# Patient Record
Sex: FEMALE | URBAN - METROPOLITAN AREA
[De-identification: names, ages, dates, MRNs, and addresses within clinical notes are randomized per-mention and may not be internally consistent; named-entity substitution may affect disease eponyms.]

---

## 2020-04-02 ENCOUNTER — APPOINTMENT (RX ONLY)
Dept: URBAN - METROPOLITAN AREA CLINIC 151 | Facility: CLINIC | Age: 25
Setting detail: DERMATOLOGY
End: 2020-04-02

## 2020-04-02 DIAGNOSIS — L64.8 OTHER ANDROGENIC ALOPECIA: ICD-10-CM | Status: INADEQUATELY CONTROLLED

## 2020-04-02 PROCEDURE — ? COUNSELING

## 2020-04-02 PROCEDURE — 99201: CPT | Mod: 95

## 2020-04-02 PROCEDURE — ? PRESCRIPTION MEDICATION MANAGEMENT

## 2020-04-02 PROCEDURE — ? ORDER TESTS

## 2020-04-02 PROCEDURE — ? DIAGNOSIS COMMENT

## 2020-04-02 NOTE — PROCEDURE: PRESCRIPTION MEDICATION MANAGEMENT
Detail Level: Zone
Otc Regimen: Multivitamin \\nRogaine 5% foam QHS
Render In Strict Bullet Format?: No

## 2020-04-02 NOTE — PROCEDURE: ORDER TESTS
Expected Date Of Service: 04/02/2020
Performing Laboratory: [id number]
Bill For Surgical Tray: no
Lab Facility: [id number]
Billing Type: Third-Party Bill

## 2020-04-02 NOTE — PROCEDURE: DIAGNOSIS COMMENT
Comment: Rudy HOPPER, suggested given pt’s younger age that we obtain labs to r/o PCOS and low ferritin. Pt has had normal thryoid lab testing done recently.
Detail Level: Zone

## 2020-10-23 ENCOUNTER — APPOINTMENT (RX ONLY)
Dept: URBAN - METROPOLITAN AREA CLINIC 151 | Facility: CLINIC | Age: 25
Setting detail: DERMATOLOGY
End: 2020-10-23

## 2020-10-23 DIAGNOSIS — Q828 OTHER SPECIFIED ANOMALIES OF SKIN: ICD-10-CM

## 2020-10-23 DIAGNOSIS — L64.8 OTHER ANDROGENIC ALOPECIA: ICD-10-CM

## 2020-10-23 DIAGNOSIS — Q819 OTHER SPECIFIED ANOMALIES OF SKIN: ICD-10-CM

## 2020-10-23 DIAGNOSIS — Q826 OTHER SPECIFIED ANOMALIES OF SKIN: ICD-10-CM

## 2020-10-23 PROBLEM — L85.8 OTHER SPECIFIED EPIDERMAL THICKENING: Status: ACTIVE | Noted: 2020-10-23

## 2020-10-23 PROCEDURE — ? COUNSELING

## 2020-10-23 PROCEDURE — ? PRESCRIPTION MEDICATION MANAGEMENT

## 2020-10-23 PROCEDURE — ? DIAGNOSIS COMMENT

## 2020-10-23 PROCEDURE — 99213 OFFICE O/P EST LOW 20 MIN: CPT

## 2020-10-23 PROCEDURE — ? PRESCRIPTION SAMPLES PROVIDED

## 2020-10-23 PROCEDURE — ? ORDER TESTS

## 2020-10-23 PROCEDURE — ? PRESCRIPTION

## 2020-10-23 RX ORDER — SPIRONOLACTONE 50 MG/1
TABLET, FILM COATED ORAL
Qty: 90 | Refills: 3 | Status: ERX | COMMUNITY
Start: 2020-10-23

## 2020-10-23 RX ADMIN — SPIRONOLACTONE: 50 TABLET, FILM COATED ORAL at 00:00

## 2020-10-23 NOTE — PROCEDURE: ORDER TESTS
Expected Date Of Service: 10/23/2020
Billing Type: Third-Party Bill
Lab Facility: [id number]
Bill For Surgical Tray: no
Performing Laboratory: [id number]

## 2020-10-23 NOTE — PROCEDURE: PRESCRIPTION MEDICATION MANAGEMENT
Plan: Pt to have labs done 2 weeks after starting spironolactone (she is also on cyclobenzaprine, gabapentin, hydrocodone prn, and meloxicam)
Detail Level: Zone
Otc Regimen: Multivitamin
Render In Strict Bullet Format?: No
Continue Regimen: Rogaine 5% foam QHS